# Patient Record
Sex: FEMALE | Race: WHITE | NOT HISPANIC OR LATINO | Employment: UNEMPLOYED | URBAN - METROPOLITAN AREA
[De-identification: names, ages, dates, MRNs, and addresses within clinical notes are randomized per-mention and may not be internally consistent; named-entity substitution may affect disease eponyms.]

---

## 2023-06-06 LAB
MAGNESIUM (MG/DL) IN SER/PLAS: 2.16 MG/DL (ref 1.6–2.4)
THYROTROPIN (MIU/L) IN SER/PLAS BY DETECTION LIMIT <= 0.05 MIU/L: 1.69 MIU/L (ref 0.44–3.98)

## 2023-10-17 ENCOUNTER — TELEPHONE (OUTPATIENT)
Dept: CARDIOLOGY | Facility: CLINIC | Age: 58
End: 2023-10-17
Payer: COMMERCIAL

## 2023-10-17 NOTE — TELEPHONE ENCOUNTER
Spoke with patient and informed her that her cardiac monitor results as reviewed by Dr. Young were benign; no arrhythmias noted and an average heart rate of 65 bpm. Therefore, there will be no change in her plan of care at this time nor follow up needed unless she has a change in status/symptoms. Patient verbalized understanding.

## 2023-10-31 ENCOUNTER — HOSPITAL ENCOUNTER (OUTPATIENT)
Dept: CARDIOLOGY | Facility: HOSPITAL | Age: 58
Discharge: HOME | End: 2023-10-31
Payer: COMMERCIAL

## 2023-10-31 DIAGNOSIS — R00.2 PALPITATIONS: ICD-10-CM

## 2023-10-31 PROCEDURE — 93248 EXT ECG>7D<15D REV&INTERPJ: CPT | Performed by: INTERNAL MEDICINE

## 2024-05-09 PROBLEM — R42 VERTIGO: Status: ACTIVE | Noted: 2024-05-09

## 2024-05-09 PROBLEM — R00.2 PALPITATIONS: Status: ACTIVE | Noted: 2021-03-09

## 2024-05-09 PROBLEM — E55.9 VITAMIN D DEFICIENCY: Status: ACTIVE | Noted: 2019-06-26

## 2024-05-09 PROBLEM — R55 PRE-SYNCOPE: Status: ACTIVE | Noted: 2024-05-09

## 2024-05-09 PROBLEM — M54.17 LUMBOSACRAL RADICULOPATHY AT L5: Status: ACTIVE | Noted: 2024-05-09

## 2024-05-09 PROBLEM — I95.1 ORTHOSTATIC HYPOTENSION: Status: ACTIVE | Noted: 2024-05-09

## 2024-05-09 PROBLEM — R06.02 SHORTNESS OF BREATH: Status: ACTIVE | Noted: 2024-05-09

## 2024-05-09 RX ORDER — AMITRIPTYLINE HYDROCHLORIDE 10 MG/1
1 TABLET, FILM COATED ORAL NIGHTLY
COMMUNITY
Start: 2023-05-05 | End: 2024-05-10 | Stop reason: WASHOUT

## 2024-05-09 RX ORDER — CYCLOBENZAPRINE HCL 10 MG
1 TABLET ORAL 3 TIMES DAILY PRN
COMMUNITY
Start: 2014-12-30 | End: 2024-05-10 | Stop reason: WASHOUT

## 2024-05-09 RX ORDER — LORAZEPAM 0.5 MG/1
TABLET ORAL
COMMUNITY
Start: 2023-05-18 | End: 2024-05-10 | Stop reason: WASHOUT

## 2024-05-09 RX ORDER — DICLOFENAC SODIUM 20 MG/G
SOLUTION TOPICAL
COMMUNITY
Start: 2015-11-24 | End: 2024-05-10 | Stop reason: WASHOUT

## 2024-05-09 RX ORDER — OMEPRAZOLE 40 MG/1
1 CAPSULE, DELAYED RELEASE ORAL
COMMUNITY
Start: 2022-11-30 | End: 2024-05-10 | Stop reason: WASHOUT

## 2024-05-09 RX ORDER — CHOLECALCIFEROL (VITAMIN D3) 125 MCG
5000 CAPSULE ORAL
COMMUNITY
Start: 2021-09-27

## 2024-05-10 ENCOUNTER — HOSPITAL ENCOUNTER (OUTPATIENT)
Dept: CARDIOLOGY | Facility: CLINIC | Age: 59
Discharge: HOME | End: 2024-05-10
Payer: COMMERCIAL

## 2024-05-10 ENCOUNTER — OFFICE VISIT (OUTPATIENT)
Dept: CARDIOLOGY | Facility: CLINIC | Age: 59
End: 2024-05-10
Payer: COMMERCIAL

## 2024-05-10 VITALS
WEIGHT: 143.5 LBS | DIASTOLIC BLOOD PRESSURE: 68 MMHG | HEIGHT: 64 IN | BODY MASS INDEX: 24.5 KG/M2 | HEART RATE: 59 BPM | OXYGEN SATURATION: 99 % | SYSTOLIC BLOOD PRESSURE: 100 MMHG

## 2024-05-10 DIAGNOSIS — R00.2 PALPITATIONS: ICD-10-CM

## 2024-05-10 DIAGNOSIS — R00.2 PALPITATIONS: Primary | ICD-10-CM

## 2024-05-10 PROCEDURE — 93005 ELECTROCARDIOGRAM TRACING: CPT | Mod: 59 | Performed by: NURSE PRACTITIONER

## 2024-05-10 PROCEDURE — 99214 OFFICE O/P EST MOD 30 MIN: CPT | Performed by: NURSE PRACTITIONER

## 2024-05-10 PROCEDURE — 93010 ELECTROCARDIOGRAM REPORT: CPT | Performed by: INTERNAL MEDICINE

## 2024-05-10 PROCEDURE — 93248 EXT ECG>7D<15D REV&INTERPJ: CPT | Performed by: INTERNAL MEDICINE

## 2024-05-10 PROCEDURE — 1036F TOBACCO NON-USER: CPT | Performed by: NURSE PRACTITIONER

## 2024-05-10 PROCEDURE — 93246 EXT ECG>7D<15D RECORDING: CPT

## 2024-05-10 ASSESSMENT — ENCOUNTER SYMPTOMS
ABDOMINAL PAIN: 0
MYALGIAS: 0
SHORTNESS OF BREATH: 0
SORE THROAT: 0
DIZZINESS: 0
SNORING: 0
IRREGULAR HEARTBEAT: 1
DIARRHEA: 0
DYSPNEA ON EXERTION: 0
BLURRED VISION: 0
DOUBLE VISION: 0
HEADACHES: 0
VOMITING: 0
FEVER: 0
HEMOPTYSIS: 0
PALPITATIONS: 1
SPUTUM PRODUCTION: 0
WEAKNESS: 0
DEPRESSION: 0
OCCASIONAL FEELINGS OF UNSTEADINESS: 1
SYNCOPE: 0
FALLS: 0
DIAPHORESIS: 0
NEAR-SYNCOPE: 0
PND: 0
COUGH: 0
ORTHOPNEA: 0
LIGHT-HEADEDNESS: 0
LOSS OF SENSATION IN FEET: 0
NAUSEA: 0

## 2024-05-10 ASSESSMENT — PATIENT HEALTH QUESTIONNAIRE - PHQ9
2. FEELING DOWN, DEPRESSED OR HOPELESS: NOT AT ALL
1. LITTLE INTEREST OR PLEASURE IN DOING THINGS: NOT AT ALL
SUM OF ALL RESPONSES TO PHQ9 QUESTIONS 1 AND 2: 0

## 2024-05-10 ASSESSMENT — COLUMBIA-SUICIDE SEVERITY RATING SCALE - C-SSRS
2. HAVE YOU ACTUALLY HAD ANY THOUGHTS OF KILLING YOURSELF?: NO
6. HAVE YOU EVER DONE ANYTHING, STARTED TO DO ANYTHING, OR PREPARED TO DO ANYTHING TO END YOUR LIFE?: NO
1. IN THE PAST MONTH, HAVE YOU WISHED YOU WERE DEAD OR WISHED YOU COULD GO TO SLEEP AND NOT WAKE UP?: NO

## 2024-05-10 ASSESSMENT — PAIN SCALES - GENERAL: PAINLEVEL: 0-NO PAIN

## 2024-05-10 NOTE — PROGRESS NOTES
"Subjective   Valeri Fernandez is a 59 y.o. female.    Chief Complaint:  Palpitations (?Tachycardia)    59 year old female presents today for evaluation of irregular heart best and chest discomfort.   No significant PMH/PSH  Patient denies any tobacco, caffeine, ETOH, vaping or other recreational substances  Family history includes her grandfather had a pacemaker.    Patient is concerned about her blood pressure reading being too low today, she denies and dizziness/syncope/near syncope.  She admits to some fatigue.    She's been evaluated for palpitations and a racing heart beat in the past, but has never required treatment for arrhythmias.  She said this week she had an episode where it felt like her heart was beating funny, maybe fast that lasted for 10 hours and it kept her up all night.  She cannot think of anything she did different to induce something like this.  She felt like she was afraid to go to bed.    She exercises daily-speed walks and generally stays well hydrated.    CT calcium score in 2020 was 0  ECG 5/10/2024 NSR HR 64 bpm, minimal voltage for LVH, may be normal variant (present on ECG last year)    /68   Pulse 59   Ht 1.626 m (5' 4\")   Wt 65.1 kg (143 lb 8 oz)   SpO2 99%   BMI 24.63 kg/m²   Current Outpatient Medications on File Prior to Visit   Medication Sig Dispense Refill    cholecalciferol (Vitamin D-3) 125 MCG (5000 UT) capsule Take 1 capsule (125 mcg) by mouth once daily.      [DISCONTINUED] amitriptyline (Elavil) 10 mg tablet Take 1 tablet (10 mg) by mouth once daily at bedtime.      [DISCONTINUED] cyclobenzaprine (Flexeril) 10 mg tablet Take 1 tablet (10 mg) by mouth 3 times a day as needed.      [DISCONTINUED] diclofenac sodium (Pennsaid) 20 mg/gram /actuation(2 %) solution in metered-dose pump       [DISCONTINUED] linaCLOtide (Linzess) 290 mcg capsule Take 1 capsule (290 mcg) by mouth once daily.      [DISCONTINUED] LORazepam (Ativan) 0.5 mg tablet TAKE 1 TABLET 1 HOUR BEFORE MRI, " IF NEEDED MAY TAKE SECOND DOSE IN 10 MINUTES, MUST HAVE       [DISCONTINUED] omeprazole (PriLOSEC) 40 mg DR capsule Take 1 capsule (40 mg) by mouth once daily in the morning. Take before meals.       No current facility-administered medications on file prior to visit.       Review of Systems   Constitutional: Positive for malaise/fatigue. Negative for diaphoresis and fever.   HENT:  Negative for congestion and sore throat.    Eyes:  Negative for blurred vision and double vision.   Cardiovascular:  Positive for irregular heartbeat and palpitations. Negative for chest pain, dyspnea on exertion, leg swelling, near-syncope, orthopnea, paroxysmal nocturnal dyspnea and syncope.   Respiratory:  Negative for cough, hemoptysis, shortness of breath, snoring and sputum production.    Hematologic/Lymphatic: Negative for bleeding problem.   Skin:  Negative for rash.   Musculoskeletal:  Negative for falls, joint pain and myalgias.   Gastrointestinal:  Negative for abdominal pain, diarrhea, nausea and vomiting.   Neurological:  Negative for dizziness, headaches, light-headedness and weakness.   All other systems reviewed and are negative.      Objective   Constitutional:       Appearance: Healthy appearance. Not in distress.   Eyes:      Conjunctiva/sclera: Conjunctivae normal.      Pupils: Pupils are equal, round, and reactive to light.   HENT:    Mouth/Throat:      Pharynx: Oropharynx is clear.   Pulmonary:      Effort: Pulmonary effort is normal.      Breath sounds: Normal breath sounds. No wheezing. No rhonchi. No rales.   Cardiovascular:      PMI at left midclavicular line. Normal rate. Regular rhythm.      Murmurs: There is no murmur.      No gallop.    Pulses:     Intact distal pulses.   Edema:     Peripheral edema absent.   Abdominal:      General: Bowel sounds are normal.      Palpations: Abdomen is soft.      Tenderness: There is no abdominal tenderness.   Musculoskeletal: Normal range of motion.         General:  No tenderness. Skin:     General: Skin is warm and dry.   Neurological:      General: No focal deficit present.      Mental Status: Alert and oriented to person, place and time.         Lab Review:   Lab Results   Component Value Date     05/08/2021    K 3.9 05/08/2021     05/08/2021    CO2 26 05/08/2021    BUN 15 05/08/2021    CREATININE 0.69 05/08/2021    GLUCOSE 105 (H) 05/08/2021    CALCIUM 9.5 05/08/2021     Lab Results   Component Value Date    WBC 5.7 05/08/2021    HGB 12.7 05/08/2021    HCT 35.7 (L) 05/08/2021    MCV 88 05/08/2021     05/08/2021       Assessment/Plan   The encounter diagnosis was Palpitations.  Patient presents for evaluation of palpitations associated with some chest discomfort and fatigue.  She's had previous work ups in the past but has not required treatments for anything at this point  We discussed placing a holter monitor, but reminded patient that if there isn't another episode like 2 nights ago, it will likely be normal  CT calcium score in 2020 is 0, which is reassuring  14 day holter monitor  Echocardiogram  Patient can consider home ECG monitoring devices to evaluate these episode when they happen and can send them to us.  Patient encouraged to stay well hydrated with exercise and to drink an electrolyte drink daily.  Her BP is on the low side of normal, she denies any symptoms of hypotension and recent lab work is normal  I will call patient with test results

## 2024-05-16 LAB
ATRIAL RATE: 64 BPM
P AXIS: 71 DEGREES
P OFFSET: 178 MS
P ONSET: 139 MS
PR INTERVAL: 156 MS
Q ONSET: 217 MS
QRS COUNT: 11 BEATS
QRS DURATION: 92 MS
QT INTERVAL: 416 MS
QTC CALCULATION(BAZETT): 429 MS
QTC FREDERICIA: 425 MS
R AXIS: 86 DEGREES
T AXIS: 71 DEGREES
T OFFSET: 425 MS
VENTRICULAR RATE: 64 BPM

## 2024-05-28 ENCOUNTER — HOSPITAL ENCOUNTER (OUTPATIENT)
Dept: CARDIOLOGY | Facility: CLINIC | Age: 59
Discharge: HOME | End: 2024-05-28
Payer: COMMERCIAL

## 2024-05-28 DIAGNOSIS — R00.2 PALPITATIONS: ICD-10-CM

## 2024-05-28 LAB
AORTIC VALVE PEAK VELOCITY: 1.2 M/S
AV PEAK GRADIENT: 5.8 MMHG
AVA (PEAK VEL): 3.21 CM2
EJECTION FRACTION APICAL 4 CHAMBER: 56.9
LEFT ATRIUM VOLUME AREA LENGTH INDEX BSA: 24.2 ML/M2
LEFT VENTRICLE INTERNAL DIMENSION DIASTOLE: 4.57 CM (ref 3.5–6)
LEFT VENTRICULAR OUTFLOW TRACT DIAMETER: 2.24 CM
LV EJECTION FRACTION BIPLANE: 57 %
MITRAL VALVE E/A RATIO: 1.08
MITRAL VALVE E/E' RATIO: 7.83
RIGHT VENTRICLE FREE WALL PEAK S': 12 CM/S
RIGHT VENTRICLE PEAK SYSTOLIC PRESSURE: 16.1 MMHG
TRICUSPID ANNULAR PLANE SYSTOLIC EXCURSION: 2.7 CM

## 2024-05-28 PROCEDURE — 93306 TTE W/DOPPLER COMPLETE: CPT

## 2024-05-28 PROCEDURE — 93306 TTE W/DOPPLER COMPLETE: CPT | Performed by: INTERNAL MEDICINE

## 2024-06-10 LAB — BODY SURFACE AREA: 1.71 M2

## 2024-06-25 ENCOUNTER — TELEPHONE (OUTPATIENT)
Dept: CARDIOLOGY | Facility: HOSPITAL | Age: 59
End: 2024-06-25
Payer: COMMERCIAL

## 2024-06-25 NOTE — TELEPHONE ENCOUNTER
Called patient with both echo and monitor results.  Both tests were normal with no abnormalities that could explain her fatigue or chest pain.  I also sent a [employer] message attached to the results and left a phone number for her to call back if she wanted to further discuss these results